# Patient Record
Sex: MALE | Race: WHITE | NOT HISPANIC OR LATINO | Employment: FULL TIME | URBAN - METROPOLITAN AREA
[De-identification: names, ages, dates, MRNs, and addresses within clinical notes are randomized per-mention and may not be internally consistent; named-entity substitution may affect disease eponyms.]

---

## 2024-06-23 SDOH — HEALTH STABILITY: PHYSICAL HEALTH: ON AVERAGE, HOW MANY MINUTES DO YOU ENGAGE IN EXERCISE AT THIS LEVEL?: 60 MIN

## 2024-06-23 SDOH — HEALTH STABILITY: PHYSICAL HEALTH: ON AVERAGE, HOW MANY DAYS PER WEEK DO YOU ENGAGE IN MODERATE TO STRENUOUS EXERCISE (LIKE A BRISK WALK)?: 6 DAYS

## 2024-06-23 ASSESSMENT — SOCIAL DETERMINANTS OF HEALTH (SDOH): HOW OFTEN DO YOU GET TOGETHER WITH FRIENDS OR RELATIVES?: ONCE A WEEK

## 2024-06-26 PROBLEM — K51.90 ULCERATIVE COLITIS (H): Status: ACTIVE | Noted: 2019-01-23

## 2024-06-28 ENCOUNTER — OFFICE VISIT (OUTPATIENT)
Dept: FAMILY MEDICINE | Facility: CLINIC | Age: 29
End: 2024-06-28
Payer: COMMERCIAL

## 2024-06-28 VITALS
TEMPERATURE: 98.4 F | OXYGEN SATURATION: 97 % | DIASTOLIC BLOOD PRESSURE: 58 MMHG | WEIGHT: 206 LBS | RESPIRATION RATE: 12 BRPM | SYSTOLIC BLOOD PRESSURE: 118 MMHG | BODY MASS INDEX: 25.61 KG/M2 | HEART RATE: 54 BPM | HEIGHT: 75 IN

## 2024-06-28 DIAGNOSIS — Z13.220 SCREENING FOR HYPERLIPIDEMIA: ICD-10-CM

## 2024-06-28 DIAGNOSIS — Z00.00 ANNUAL PHYSICAL EXAM: Primary | ICD-10-CM

## 2024-06-28 DIAGNOSIS — K51.919 ULCERATIVE COLITIS WITH COMPLICATION, UNSPECIFIED LOCATION (H): ICD-10-CM

## 2024-06-28 PROBLEM — K51.30 ULCERATIVE RECTOSIGMOIDITIS WITHOUT COMPLICATION (H): Status: ACTIVE | Noted: 2020-03-13

## 2024-06-28 LAB
ALBUMIN SERPL BCG-MCNC: 4.9 G/DL (ref 3.5–5.2)
ALP SERPL-CCNC: 79 U/L (ref 40–150)
ALT SERPL W P-5'-P-CCNC: 19 U/L (ref 0–70)
ANION GAP SERPL CALCULATED.3IONS-SCNC: 9 MMOL/L (ref 7–15)
AST SERPL W P-5'-P-CCNC: 22 U/L (ref 0–45)
BILIRUB SERPL-MCNC: 1 MG/DL
BUN SERPL-MCNC: 17.4 MG/DL (ref 6–20)
CALCIUM SERPL-MCNC: 9.9 MG/DL (ref 8.6–10)
CHLORIDE SERPL-SCNC: 103 MMOL/L (ref 98–107)
CHOLEST SERPL-MCNC: 165 MG/DL
CREAT SERPL-MCNC: 1.06 MG/DL (ref 0.67–1.17)
CRP SERPL-MCNC: <3 MG/L
DEPRECATED HCO3 PLAS-SCNC: 27 MMOL/L (ref 22–29)
EGFRCR SERPLBLD CKD-EPI 2021: >90 ML/MIN/1.73M2
ERYTHROCYTE [DISTWIDTH] IN BLOOD BY AUTOMATED COUNT: 12.2 % (ref 10–15)
ERYTHROCYTE [SEDIMENTATION RATE] IN BLOOD BY WESTERGREN METHOD: 3 MM/HR (ref 0–15)
FASTING STATUS PATIENT QL REPORTED: ABNORMAL
FASTING STATUS PATIENT QL REPORTED: NORMAL
GLUCOSE SERPL-MCNC: 78 MG/DL (ref 70–99)
HCT VFR BLD AUTO: 44.8 % (ref 40–53)
HDLC SERPL-MCNC: 50 MG/DL
HGB BLD-MCNC: 15.5 G/DL (ref 13.3–17.7)
LDLC SERPL CALC-MCNC: 106 MG/DL
MCH RBC QN AUTO: 29.6 PG (ref 26.5–33)
MCHC RBC AUTO-ENTMCNC: 34.6 G/DL (ref 31.5–36.5)
MCV RBC AUTO: 86 FL (ref 78–100)
NONHDLC SERPL-MCNC: 115 MG/DL
PLATELET # BLD AUTO: 181 10E3/UL (ref 150–450)
POTASSIUM SERPL-SCNC: 4.1 MMOL/L (ref 3.4–5.3)
PROT SERPL-MCNC: 7.6 G/DL (ref 6.4–8.3)
RBC # BLD AUTO: 5.23 10E6/UL (ref 4.4–5.9)
SODIUM SERPL-SCNC: 139 MMOL/L (ref 135–145)
TRIGL SERPL-MCNC: 44 MG/DL
WBC # BLD AUTO: 4.2 10E3/UL (ref 4–11)

## 2024-06-28 PROCEDURE — 85027 COMPLETE CBC AUTOMATED: CPT | Performed by: PHYSICIAN ASSISTANT

## 2024-06-28 PROCEDURE — 80053 COMPREHEN METABOLIC PANEL: CPT | Performed by: PHYSICIAN ASSISTANT

## 2024-06-28 PROCEDURE — 99213 OFFICE O/P EST LOW 20 MIN: CPT | Mod: 25 | Performed by: PHYSICIAN ASSISTANT

## 2024-06-28 PROCEDURE — 99385 PREV VISIT NEW AGE 18-39: CPT | Performed by: PHYSICIAN ASSISTANT

## 2024-06-28 PROCEDURE — 86140 C-REACTIVE PROTEIN: CPT | Performed by: PHYSICIAN ASSISTANT

## 2024-06-28 PROCEDURE — 36415 COLL VENOUS BLD VENIPUNCTURE: CPT | Performed by: PHYSICIAN ASSISTANT

## 2024-06-28 PROCEDURE — 80061 LIPID PANEL: CPT | Performed by: PHYSICIAN ASSISTANT

## 2024-06-28 PROCEDURE — 85652 RBC SED RATE AUTOMATED: CPT | Performed by: PHYSICIAN ASSISTANT

## 2024-06-28 RX ORDER — MESALAMINE 1.2 G/1
1200 TABLET, DELAYED RELEASE ORAL DAILY
COMMUNITY
Start: 2019-01-23 | End: 2024-06-28

## 2024-06-28 RX ORDER — MESALAMINE 1.2 G/1
1200 TABLET, DELAYED RELEASE ORAL DAILY
Qty: 90 TABLET | Refills: 1 | Status: SHIPPED | OUTPATIENT
Start: 2024-06-28 | End: 2024-07-06

## 2024-06-28 RX ORDER — MESALAMINE 4 G/60ML
4 SUSPENSION RECTAL AT BEDTIME
Qty: 60 ML | Refills: 5 | Status: SHIPPED | OUTPATIENT
Start: 2024-06-28 | End: 2024-07-06

## 2024-06-28 RX ORDER — MESALAMINE 1.2 G/1
4600 TABLET, DELAYED RELEASE ORAL EVERY MORNING
Qty: 360 TABLET | Refills: 1 | Status: SHIPPED | OUTPATIENT
Start: 2024-06-28

## 2024-06-28 RX ORDER — MESALAMINE 4 G/60ML
4 SUSPENSION RECTAL AT BEDTIME
COMMUNITY
Start: 2019-01-23 | End: 2024-06-28

## 2024-06-28 ASSESSMENT — ENCOUNTER SYMPTOMS
SEIZURES: 0
COLOR CHANGE: 0
EYE PAIN: 0
ARTHRALGIAS: 0
VOMITING: 0
BACK PAIN: 0
PALPITATIONS: 0
CHILLS: 0
FEVER: 0
DYSURIA: 0
HEMATURIA: 0
SHORTNESS OF BREATH: 0
COUGH: 0
ABDOMINAL PAIN: 0
SORE THROAT: 0

## 2024-06-28 NOTE — TELEPHONE ENCOUNTER
Can we call patient and clarify how he is taking this medication.  I am unsure if he is taking one 1200 mg tablet or if he is truly taking 4 tablets.  Thank you

## 2024-06-28 NOTE — TELEPHONE ENCOUNTER
Patient reports he takes 4 1,200 mg tablets once daily in the morning. Appears current prescription has 2 sigs with different instructions. Updated medication pended.     Mar Andre RN  [facility]

## 2024-06-28 NOTE — PROGRESS NOTES
"Preventive Care Visit  [facility]  Luis M Luque PA-C, Family Medicine  Jun 28, 2024      Assessment & Plan     Annual physical exam  Overall doing well.  Will update screening labs today  - CBC with platelets; Future  - Comprehensive metabolic panel (BMP + Alb, Alk Phos, ALT, AST, Total. Bili, TP); Future    Screening for hyperlipidemia  Will check a fasting lipid panel today.  Diet and exercise discussed  - Lipid panel reflex to direct LDL Fasting; Future    Ulcerative colitis with complication, unspecified location (H)  Diagnosed with ulcerative colitis December 2020.  Medications are currently keeping his symptoms are well-controlled.  Recently moved to [place] and does have an appointment with gastroenterology.  Will refill meds to bridge him.  Will check a CRP and a sed rate.  - mesalamine (ROWASA) 4 g enema; Place 1 enema (4 g) rectally at bedtime  - mesalamine (LIALDA) 1.2 g DR tablet; Take 1 tablet (1,200 mg) by mouth daily 4 tablets daily in the morning  - ESR: Erythrocyte sedimentation rate; Future  - CRP, inflammation; Future        Patient has been advised of split billing requirements and indicates understanding: Yes        BMI  Estimated body mass index is 26.02 kg/m  as calculated from the following:    Height as of this encounter: 1.895 m (6' 2.61\").    Weight as of this encounter: 93.4 kg (206 lb).   Weight management plan: Discussed healthy diet and exercise guidelines    Counseling  Appropriate preventive services were discussed with this patient, including applicable screening as appropriate for fall prevention, nutrition, physical activity, Tobacco-use cessation, weight loss and cognition.  Checklist reviewing preventive services available has been given to the patient.  Reviewed patient's diet, addressing concerns and/or questions.   He is at risk for psychosocial distress and has been provided with information to reduce risk.         Shubham De Leons is a " 29 year old, presenting for the following:  Physical (Non fasting Physical)        6/28/2024     9:12 AM   Additional Questions   Roomed by Stephanie Bridges   Accompanied by none        Health Care Directive  Patient does not have a Health Care Directive or Living Will: Discussed advance care planning with patient; however, patient declined at this time.    Patient is a pleasant 29-year-old male who presents to the clinic today to establish care.  Recently moved here from [place] about a week ago.  He moved here for a new job.  Him and his wife are expecting their first child.  Overall he is doing well.  He does have a diagnosis of ulcerative colitis diagnosed December 2022.  He states that his symptoms have stayed well-controlled at this time.  Since his diagnosis he is only had 1 flareup and this was after he was unable to take some of his medications for a few days.  He has not been noticing any blood in his stool or not having any abdominal pains.            6/23/2024   General Health   How would you rate your overall physical health? Excellent   Feel stress (tense, anxious, or unable to sleep) Only a little      (!) STRESS CONCERN      6/23/2024   Nutrition   Three or more servings of calcium each day? (!) I DON'T KNOW   Diet: Regular (no restrictions)   How many servings of fruit and vegetables per day? (!) 0-1   How many sweetened beverages each day? 0-1            6/23/2024   Exercise   Days per week of moderate/strenous exercise 6 days   Average minutes spent exercising at this level 60 min            6/23/2024   Social Factors   Frequency of gathering with friends or relatives Once a week   Worry food won't last until get money to buy more No   Food not last or not have enough money for food? No   Do you have housing? (Housing is defined as stable permanent housing and does not include staying ouside in a car, in a tent, in an abandoned building, in an overnight shelter, or couch-surfing.) Yes   Are you  worried about losing your housing? No   Lack of transportation? No   Unable to get utilities (heat,electricity)? No            6/23/2024   Dental   Dentist two times every year? Yes            6/23/2024   TB Screening   Were you born outside of the [place]? No            Today's PHQ-2 Score:       6/27/2024     7:31 PM   PHQ-2 ( 1999 [employer])   Q1: Little interest or pleasure in doing things 0   Q2: Feeling down, depressed or hopeless 0   PHQ-2 Score 0   Q1: Little interest or pleasure in doing things Not at all   Q2: Feeling down, depressed or hopeless Not at all   PHQ-2 Score 0           6/23/2024   Substance Use   Alcohol more than 3/day or more than 7/wk No   Do you use any other substances recreationally? No        Social History     Tobacco Use    Smoking status: Never     Passive exposure: Never    Smokeless tobacco: Never   Vaping Use    Vaping status: Never Used             6/23/2024   One time HIV Screening   Previous HIV test? No          6/23/2024   STI Screening   New sexual partner(s) since last STI/HIV test? No            6/23/2024   Contraception/Family Planning   Questions about contraception or family planning No           Reviewed and updated as needed this visit by Provider                    No past medical history on file.  No past surgical history on file.    Review of Systems   Constitutional:  Negative for chills and fever.   HENT:  Negative for ear pain and sore throat.    Eyes:  Negative for pain and visual disturbance.   Respiratory:  Negative for cough and shortness of breath.    Cardiovascular:  Negative for chest pain and palpitations.   Gastrointestinal:  Negative for abdominal pain and vomiting.   Genitourinary:  Negative for dysuria and hematuria.   Musculoskeletal:  Negative for arthralgias and back pain.   Skin:  Negative for color change and rash.   Neurological:  Negative for seizures and syncope.   All other systems reviewed and are negative.         Objective    Exam  /58 (BP  "Location: Right arm, Patient Position: Sitting, Cuff Size: Adult Regular)   Pulse 54   Temp 98.4  F (36.9  C) (Oral)   Resp 12   Ht 1.895 m (6' 2.61\")   Wt 93.4 kg (206 lb)   SpO2 97%   BMI 26.02 kg/m     Estimated body mass index is 26.02 kg/m  as calculated from the following:    Height as of this encounter: 1.895 m (6' 2.61\").    Weight as of this encounter: 93.4 kg (206 lb).    Physical Exam  Vitals and nursing note reviewed.   Constitutional:       General: He is not in acute distress.     Appearance: Normal appearance. He is well-developed and well-groomed. He is not ill-appearing or toxic-appearing.   HENT:      Head: Normocephalic and atraumatic.      Right Ear: Tympanic membrane, ear canal and external ear normal.      Left Ear: Tympanic membrane, ear canal and external ear normal.      Mouth/Throat:      Lips: Pink.      Mouth: Mucous membranes are moist.      Palate: No mass.      Pharynx: Oropharynx is clear. Uvula midline.      Tonsils: No tonsillar exudate or tonsillar abscesses.   Eyes:      General: Lids are normal.         Right eye: No discharge.         Left eye: No discharge.   Neck:      Trachea: Trachea normal.   Cardiovascular:      Rate and Rhythm: Normal rate and regular rhythm.      Heart sounds: S1 normal and S2 normal. No murmur heard.  Pulmonary:      Effort: Pulmonary effort is normal.      Breath sounds: Normal breath sounds and air entry.   Abdominal:      General: Bowel sounds are normal. There is no distension.      Palpations: Abdomen is soft.      Tenderness: There is no abdominal tenderness. There is no guarding or rebound.   Musculoskeletal:      Cervical back: Full passive range of motion without pain and neck supple.      Right lower leg: No edema.      Left lower leg: No edema.   Lymphadenopathy:      Cervical: No cervical adenopathy.   Skin:     General: Skin is warm and dry.      Findings: No lesion or rash.   Neurological:      General: No focal deficit present.     "  Mental Status: He is alert.      Cranial Nerves: No cranial nerve deficit.      Gait: Gait is intact.      Deep Tendon Reflexes:      Reflex Scores:       Patellar reflexes are 2+ on the right side and 2+ on the left side.  Psychiatric:         Attention and Perception: Attention normal.         Mood and Affect: Mood normal.         Speech: Speech normal.           Signed Electronically by: Luis M Luque PA-C

## 2024-06-28 NOTE — TELEPHONE ENCOUNTER
Pharmacist from [facility] pharmacy called asking for order details for Lialda to be clarified. They would like the order to say something to the effect of take four 1,200 mg tablets daily in the morning.     Clint Springer RN  [facility]

## 2024-07-05 ENCOUNTER — TELEPHONE (OUTPATIENT)
Dept: FAMILY MEDICINE | Facility: CLINIC | Age: 29
End: 2024-07-05
Payer: COMMERCIAL

## 2024-07-05 DIAGNOSIS — K51.919 ULCERATIVE COLITIS WITH COMPLICATION, UNSPECIFIED LOCATION (H): ICD-10-CM

## 2024-07-05 RX ORDER — MESALAMINE 4 G/60ML
4 SUSPENSION RECTAL AT BEDTIME
Qty: 60 ML | Refills: 5 | Status: CANCELLED | OUTPATIENT
Start: 2024-07-05

## 2024-07-05 NOTE — TELEPHONE ENCOUNTER
mesalamine (ROWASA) 4 g enema   patient would like to get a bigger amount of this at one time please send when convenient.

## 2024-07-06 RX ORDER — MESALAMINE 4 G/60ML
4 SUSPENSION RECTAL AT BEDTIME
Qty: 5400 ML | Refills: 0 | Status: SHIPPED | OUTPATIENT
Start: 2024-07-06

## 2024-08-09 ENCOUNTER — TRANSFERRED RECORDS (OUTPATIENT)
Dept: HEALTH INFORMATION MANAGEMENT | Facility: CLINIC | Age: 29
End: 2024-08-09
Payer: COMMERCIAL

## 2024-08-16 ENCOUNTER — TRANSFERRED RECORDS (OUTPATIENT)
Dept: HEALTH INFORMATION MANAGEMENT | Facility: CLINIC | Age: 29
End: 2024-08-16
Payer: COMMERCIAL

## 2024-11-08 ENCOUNTER — TRANSFERRED RECORDS (OUTPATIENT)
Dept: HEALTH INFORMATION MANAGEMENT | Facility: CLINIC | Age: 29
End: 2024-11-08
Payer: COMMERCIAL

## 2024-11-22 ENCOUNTER — TRANSFERRED RECORDS (OUTPATIENT)
Dept: HEALTH INFORMATION MANAGEMENT | Facility: CLINIC | Age: 29
End: 2024-11-22
Payer: COMMERCIAL

## 2025-03-19 ENCOUNTER — OFFICE VISIT (OUTPATIENT)
Dept: FAMILY MEDICINE | Facility: CLINIC | Age: 30
End: 2025-03-19
Payer: COMMERCIAL

## 2025-03-19 VITALS
BODY MASS INDEX: 27.21 KG/M2 | DIASTOLIC BLOOD PRESSURE: 52 MMHG | RESPIRATION RATE: 16 BRPM | OXYGEN SATURATION: 100 % | WEIGHT: 212 LBS | HEIGHT: 74 IN | HEART RATE: 48 BPM | SYSTOLIC BLOOD PRESSURE: 105 MMHG | TEMPERATURE: 97.8 F

## 2025-03-19 DIAGNOSIS — M77.8 TRICEPS TENDINITIS: Primary | ICD-10-CM

## 2025-03-19 PROCEDURE — 1126F AMNT PAIN NOTED NONE PRSNT: CPT | Performed by: PHYSICIAN ASSISTANT

## 2025-03-19 PROCEDURE — 3074F SYST BP LT 130 MM HG: CPT | Performed by: PHYSICIAN ASSISTANT

## 2025-03-19 PROCEDURE — 99213 OFFICE O/P EST LOW 20 MIN: CPT | Performed by: PHYSICIAN ASSISTANT

## 2025-03-19 PROCEDURE — 3078F DIAST BP <80 MM HG: CPT | Performed by: PHYSICIAN ASSISTANT

## 2025-03-19 RX ORDER — PREDNISONE 20 MG/1
20 TABLET ORAL DAILY
Qty: 5 TABLET | Refills: 0 | Status: SHIPPED | OUTPATIENT
Start: 2025-03-19 | End: 2025-03-24

## 2025-03-19 ASSESSMENT — ENCOUNTER SYMPTOMS
BACK PAIN: 0
VOMITING: 0
SEIZURES: 0
ARTHRALGIAS: 0
SORE THROAT: 0
EYE PAIN: 0
ABDOMINAL PAIN: 0
HEMATURIA: 0
PALPITATIONS: 0
COLOR CHANGE: 0
DYSURIA: 0
CHILLS: 0
COUGH: 0
FEVER: 0
SHORTNESS OF BREATH: 0

## 2025-03-19 ASSESSMENT — PAIN SCALES - GENERAL: PAINLEVEL_OUTOF10: NO PAIN (0)

## 2025-03-19 NOTE — PROGRESS NOTES
"  Assessment & Plan     Triceps tendinitis  Owen is a pleasant 30-year-old male presents to the clinic today for evaluation on right elbow pain.  Over the last few weeks he has noticed pain to the right elbow and distal triceps area.  He is an avid swimmer and plays water polo.  The pain tends to aggravate mostly when he is swimming.  He states few days after he is playing water polo the pain will consist but it does improve with rest.  States that if he pushes up off of the chair it will aggravate the pain.  He is not had any injury to the elbow.  There is no numbness or tingling down or up his arm.  The pain is localized to the distal aspect of his right tricep.  He has not noticed any redness, warmth or swelling over the area.  He is not having any shoulder or neck pain.  On exam there is no palpable tenderness.  No pain with supination or pronation of his elbow.  No redness, warmth or swelling over the elbow or distal arm.  Numbness or tingling.  Strong hand grasp.  Suspect tricep tendinitis due to swimming.  Recommended rest, ice, and compression sleeve.  He is unable to do ibuprofen as he has a history of ulcerative colitis.  Has not tried Tylenol.  Will prescribe prednisone 20 mg x 5 days to help with inflammation.  Since no injury will hold off on imaging.  Could consider in the future if symptoms are not better.  If things are not better recommended physical therapy.  If he is not having improvement with conservative measures or physical therapy he is to follow-up.  Patient agreed to this plan.  Questions were answered  - predniSONE (DELTASONE) 20 MG tablet; Take 1 tablet (20 mg) by mouth daily for 5 days.  - Physical Therapy  Referral; Future          BMI  Estimated body mass index is 27.22 kg/m  as calculated from the following:    Height as of this encounter: 1.88 m (6' 2\").    Weight as of this encounter: 96.2 kg (212 lb).   Weight management plan: Discussed healthy diet and exercise " guidelines        Shubham   Rachid is a 30 year old, presenting for the following health issues:  Elbow Pain (Right elbow pain, on going for 1-2 months, 2 weeks ago got worse , no known injury, pt plays water polo usually gets worse after practice )      3/19/2025     2:12 PM   Additional Questions   Roomed by jose boyd cma     Owen is a pleasant 30-year-old male presents to the clinic today for evaluation on right elbow pain.  Over the last few weeks he has noticed pain to the right elbow and distal triceps area.  He is an avid swimmer and plays water polo.  The pain tends to aggravate mostly when he is swimming.  He states few days after he is playing water polo the pain will consist but it does improve with rest.  States that if he pushes up off of the chair it will aggravate the pain.  He is not had any injury to the elbow.  There is no numbness or tingling down or up his arm.  The pain is localized to the distal aspect of his right tricep.  He has not noticed any redness, warmth or swelling over the area.  He is not having any shoulder or neck pain.    History of Present Illness       Reason for visit:  Pain near my elbow/tricep while exercising  Symptom onset:  3-4 weeks ago  Symptoms include:  Pain in my lower tricep near my elbow especially while swimming  Symptom intensity:  Moderate  Symptom progression:  Worsening  Had these symptoms before:  No  What makes it worse:  Extending triceps under weight  What makes it better:  Not using triceps   He is taking medications regularly.          Review of Systems   Constitutional:  Negative for chills and fever.   HENT:  Negative for ear pain and sore throat.    Eyes:  Negative for pain and visual disturbance.   Respiratory:  Negative for cough and shortness of breath.    Cardiovascular:  Negative for chest pain and palpitations.   Gastrointestinal:  Negative for abdominal pain and vomiting.   Genitourinary:  Negative for dysuria and hematuria.   Musculoskeletal:   "Negative for arthralgias and back pain.        Right elbow pain   Skin:  Negative for color change and rash.   Neurological:  Negative for seizures and syncope.   All other systems reviewed and are negative.          Objective    /52 (BP Location: Left arm, Patient Position: Sitting, Cuff Size: Adult Large)   Pulse (!) 48   Temp 97.8  F (36.6  C)   Resp 16   Ht 1.88 m (6' 2\")   Wt 96.2 kg (212 lb)   SpO2 100%   BMI 27.22 kg/m    Body mass index is 27.22 kg/m .  Physical Exam  Constitutional:       General: He is not in acute distress.     Appearance: Normal appearance. He is not ill-appearing, toxic-appearing or diaphoretic.   Eyes:      Conjunctiva/sclera: Conjunctivae normal.   Musculoskeletal:      Comments: Right elbow: No pain, redness, swelling.  Full range of motion of the elbow that does not elicit pain.  No pain to the distal aspect of tricep.  No weakness to the right upper extremity.    Strong hand grasp to right side.    No pain to right shoulder.   Skin:     General: Skin is warm and dry.      Findings: No rash.   Neurological:      Mental Status: He is alert.   Psychiatric:         Mood and Affect: Mood normal.         Behavior: Behavior normal.         Thought Content: Thought content normal.         Judgment: Judgment normal.                Signed Electronically by: Luis M Luque PA-C    "

## 2025-03-25 ENCOUNTER — THERAPY VISIT (OUTPATIENT)
Dept: PHYSICAL THERAPY | Facility: REHABILITATION | Age: 30
End: 2025-03-25
Attending: PHYSICIAN ASSISTANT
Payer: COMMERCIAL

## 2025-03-25 DIAGNOSIS — M77.8 TRICEPS TENDINITIS: ICD-10-CM

## 2025-03-25 PROCEDURE — 97161 PT EVAL LOW COMPLEX 20 MIN: CPT | Mod: GP | Performed by: PHYSICAL THERAPIST

## 2025-03-25 PROCEDURE — 97110 THERAPEUTIC EXERCISES: CPT | Mod: GP | Performed by: PHYSICAL THERAPIST

## 2025-03-25 ASSESSMENT — ACTIVITIES OF DAILY LIVING (ADL)
PREPARING_FOOD: NO DIFFICULTY
PLEASE_INDICATE_YOR_PRIMARY_REASON_FOR_REFERRAL_TO_THERAPY:: ELBOW
DRIVING: NO DIFFICULTY
WASHING_YOUR_HAIR_OR_SCALP: NO DIFFICULTY
UEFI_TOTAL_SCORE/80: .9
DOING_UP_BUTTONS: NO DIFFICULTY
SLEEPING: NO DIFFICULTY
LIFTING_A_BAG_OF_GROCERIES_TO_WAIST_LEVEL: NO DIFFICULTY
VACUUMING,_SWEEPING,_OR_RAKING: NO DIFFICULTY
UEFI_TOTAL_SCORE: 72
OPENING_DOORS: NO DIFFICULTY
PUSHING_UP_ON_YOUR_HANDS: A LITTLE BIT OF DIFFICULTY
THROWING_A_BALL: A LITTLE BIT OF DIFFICULTY
TYING_OR_LACING_SHOES: NO DIFFICULTY
ANY_OF_YOUR_USUAL_WORK,_HOUSEWORK_OR_SCHOOL_ACTIVITIES: NO DIFFICULTY
DRESS: NO DIFFICULTY
USING_TOOLS_OR_APPLIANCES: NO DIFFICULTY
CLEANING: NO DIFFICULTY
OPENING_A_JAR: NO DIFFICULTY
LAUNDERING_CLOTHES: NO DIFFICULTY
YOUR_USUAL_HOBBIES,_RECREATIONAL_OR_SPORTING_ACTIVITIES: MODERATE DIFFICULTY
PLACING_AN_OBJECT_ONTO,_OR_REMOVING_IT_FROM_AN_OVERHEAD_SHELF: NO DIFFICULTY
CARRYING_A_SMALL_SUITCASE_WITH_YOUR_AFFECTED_LIMB: NO DIFFICULTY

## 2025-03-25 NOTE — PROGRESS NOTES
PHYSICAL THERAPY EVALUATION  Type of Visit: Evaluation       Fall Risk Screen:  Fall screen completed by: PT  Have you fallen 2 or more times in the past year?: No  Have you fallen and had an injury in the past year?: No  Is patient a fall risk?: No    Subjective         Presenting condition or subjective complaint: Pain in lower tricep while swimming and playing water polo    The patient presents to therapy with a chief complaint of tricep pain that is worsened with swimming and water polo. If resting the pain does improve. No specific injury.  Startedpretty minor and has slowly gotten worse. After 1 hour of practice starts to notice more pain.     Date of onset:      Relevant medical history:     Dates & types of surgery:      Prior diagnostic imaging/testing results:       Prior therapy history for the same diagnosis, illness or injury: No      Living Environment  Social support: With family members   Type of home: [facility]   Stairs to enter the home: Yes 3 Is there a railing: Yes     Ramp: No   Stairs inside the home: Yes 2 Is there a railing: Yes     Help at home: None  Equipment owned:       Employment: Yes Advanced imaging specialist  Hobbies/Interests: Water polo, HIT workouts, cycling, running    Patient goals for therapy: Play water polo without pain    Pain assessment: See objective evaluation for additional pain details     Objective   PAIN: Pain Level at Rest: 4/10  Pain Level with Use: 6/10  Pain Location: shoulder and elbow  POSTURE: Sitting Posture: Rounded shoulders  ROM:  Elbow and wrist AROM all WNL, shoulder AROM all WNL mild impingement R anterior shoulder     STRENGTH:  Elbow flexion:5/5, Elbow extension: 4+/5, Shoulder flexion: 4/5, abduction 4/5, ER 4/5, IR 4+/5  FLEXIBILITY:   SPECIAL TESTS:  positive empty can R,   PALPATION:  min tenderness to R anterior shoulder, min tender to R triceps     Assessment & Plan   CLINICAL IMPRESSIONS  Medical Diagnosis: Right Triceps Injury     Treatment Diagnosis: Right distal triceps pain, mild shoulder impingement   Impression/Assessment: Patient is a 30 year old male with tricep complaints.  The following significant findings have been identified: Pain, Decreased ROM/flexibility, Decreased joint mobility, Decreased strength, Inflammation, Impaired muscle performance, and Decreased activity tolerance. These impairments interfere with their ability to perform self care tasks, work tasks, recreational activities, household chores, driving , household mobility, and community mobility as compared to previous level of function.     Clinical Decision Making (Complexity):  Clinical Presentation: Stable/Uncomplicated  Clinical Presentation Rationale: based on medical and personal factors listed in PT evaluation  Clinical Decision Making (Complexity): Low complexity    PLAN OF CARE  Treatment Interventions:  Modalities: Cryotherapy, Cupping, Dry Needling, E-stim  Interventions: Gait Training, Manual Therapy, Neuromuscular Re-education, Therapeutic Activity, Therapeutic Exercise, Self-Care/Home Management    Long Term Goals     PT Goal 1  Goal Identifier: water polo  Goal Description: The patient will be able to return to full participation in water polo with pain <3/10  Target Date: 05/20/25      Frequency of Treatment: 1x/week  Duration of Treatment: 8 weeks    Recommended Referrals to Other Professionals:   Education Assessment:   Learner/Method: Patient  Education Comments: Eager to participate in therapy. Patient demonstrates understanding of plan of care and consents to treatment.    Risks and benefits of evaluation/treatment have been explained.   Patient/Family/caregiver agrees with Plan of Care.     Evaluation Time:     PT Eval, Low Complexity Minutes (20174): 15       Signing Clinician: Etta Johnson, PT

## 2025-04-14 ENCOUNTER — PATIENT OUTREACH (OUTPATIENT)
Dept: ONCOLOGY | Facility: CLINIC | Age: 30
End: 2025-04-14
Payer: COMMERCIAL

## 2025-04-14 DIAGNOSIS — Z84.81 FAMILY HISTORY OF CARRIER OF GENETIC DISEASE: Primary | ICD-10-CM

## 2025-04-14 NOTE — PROGRESS NOTES
Writer received referral to Cancer Risk Management/Genetic Counseling.    Referred for:   Family hx of thyroid and breast cancer    Referred By    Provider Department Location Phone   Luis M Luque PA-C [facility] [phone]       Referral reviewed for appropriate plan, and sent to New Patient Scheduling ([phone]) for completion.    Muna Springer, RN, BSN  Oncology New Patient Nurse Navigator   [facility]

## 2025-05-29 ENCOUNTER — PATIENT OUTREACH (OUTPATIENT)
Dept: CARE COORDINATION | Facility: CLINIC | Age: 30
End: 2025-05-29
Payer: COMMERCIAL

## 2025-06-23 PROBLEM — M77.8 TRICEPS TENDINITIS: Status: RESOLVED | Noted: 2025-03-25 | Resolved: 2025-06-23

## 2025-08-10 ENCOUNTER — HEALTH MAINTENANCE LETTER (OUTPATIENT)
Age: 30
End: 2025-08-10

## 2025-08-22 ENCOUNTER — TRANSFERRED RECORDS (OUTPATIENT)
Dept: HEALTH INFORMATION MANAGEMENT | Facility: CLINIC | Age: 30
End: 2025-08-22
Payer: COMMERCIAL

## 2025-08-28 ENCOUNTER — TRANSFERRED RECORDS (OUTPATIENT)
Dept: HEALTH INFORMATION MANAGEMENT | Facility: CLINIC | Age: 30
End: 2025-08-28
Payer: COMMERCIAL